# Patient Record
Sex: MALE | Race: WHITE | Employment: PART TIME | URBAN - METROPOLITAN AREA
[De-identification: names, ages, dates, MRNs, and addresses within clinical notes are randomized per-mention and may not be internally consistent; named-entity substitution may affect disease eponyms.]

---

## 2021-02-18 ENCOUNTER — TELEPHONE (OUTPATIENT)
Dept: FAMILY MEDICINE CLINIC | Age: 21
End: 2021-02-18

## 2021-02-22 ENCOUNTER — TELEPHONE (OUTPATIENT)
Dept: FAMILY MEDICINE CLINIC | Age: 21
End: 2021-02-22

## 2021-02-22 NOTE — TELEPHONE ENCOUNTER
I called and got pt scheduled for this appt .         Appointment Information   Name: Kenia Leach MRN: [id number]    Date: 2/26/2021 Status: [facility]    Time: 1:30 PM Length: 30 [id number]   Visit Type: VV SPECIAL USE CASE [[id number]] Copay: $0.00    Provider: Maged Dover DO Department: [facility]    Referral #:   Referral Status:      Referring Provider:   Patient Type:      Notes: DANNY [phone]  np est care     Disposition Notes:      Made On: 2/22/2021 12:36 PM By: Swapna Farrell

## 2021-02-22 NOTE — TELEPHONE ENCOUNTER
----- Message from Janine Verdugo sent at 2/10/2021 11:52 AM EST -----  Regarding: Dr. Mortimer Force: [phone]  Appointment not available    Caller's first and last name and relationship to patient (if not the patient): N/A      Best contact number:[phone]      Preferred date and time: As soon as possible, anytime      Scheduled appointment date and time: N/A      Reason for appointment: New patient appointment to est. PCP      Details to clarify the request: Doctors schedule is not coming up.         Janine Verdugo

## 2021-02-24 NOTE — TELEPHONE ENCOUNTER
Patient scheduled for:  Appointment Information   Name: Magalys Patrick MRN: [id number]    Date: 2/26/2021 Status: [facility]    Time: 1:30 PM Length: 30 [phone]   Visit Type: VV SPECIAL USE CASE [[id number]] Copay: $0.00    Provider: Osorio Madison DO Department: [facility]    Referral #:   Referral Status:      Referring Provider:   Patient Type:      Notes: DANNY [phone]      Close enc    Thanks  Dinesh Mcdowell S/PSR   [facility] [place] Referral Coordinator

## 2021-02-24 NOTE — TELEPHONE ENCOUNTER
----- Message from Madison Crawford sent at 2/18/2021 12:28 PM EST -----  Regarding: Dr. Clay Vega: [phone]  Appointment not available    Caller's first and last name and relationship to patient (if not the patient):N/A      Best contact number:[phone]      Preferred date and time: First available, anytime      Scheduled appointment date and time: N/A      Reason for appointment: New patient appointment. Details to clarify the request: Patient called last week on this but no one has called him back.         Madison Crawford

## 2021-03-02 ENCOUNTER — VIRTUAL VISIT (OUTPATIENT)
Dept: FAMILY MEDICINE CLINIC | Age: 21
End: 2021-03-02
Payer: COMMERCIAL

## 2021-03-02 DIAGNOSIS — F41.9 ANXIETY: ICD-10-CM

## 2021-03-02 DIAGNOSIS — Z13.6 ENCOUNTER FOR LIPID SCREENING FOR CARDIOVASCULAR DISEASE: ICD-10-CM

## 2021-03-02 DIAGNOSIS — Z00.00 ENCOUNTER FOR MEDICAL EXAMINATION TO ESTABLISH CARE: Primary | ICD-10-CM

## 2021-03-02 DIAGNOSIS — F12.90 MARIJUANA USE: ICD-10-CM

## 2021-03-02 DIAGNOSIS — Z13.220 ENCOUNTER FOR LIPID SCREENING FOR CARDIOVASCULAR DISEASE: ICD-10-CM

## 2021-03-02 DIAGNOSIS — Z83.3 FAMILY HISTORY OF DIABETES MELLITUS: ICD-10-CM

## 2021-03-02 DIAGNOSIS — F33.1 MODERATE EPISODE OF RECURRENT MAJOR DEPRESSIVE DISORDER (HCC): ICD-10-CM

## 2021-03-02 PROCEDURE — 99203 OFFICE O/P NEW LOW 30 MIN: CPT | Performed by: STUDENT IN AN ORGANIZED HEALTH CARE EDUCATION/TRAINING PROGRAM

## 2021-03-02 NOTE — PROGRESS NOTES
Marcial Huff  21 y.o. male  2000  [facility] 9293  [id number]   [address]:    Telemedicine Progress Note  Rosales Ashby DO       Encounter Date and Time: March 2, 2021 at 4:03 PM    Consent: Marcial Huff, who was seen by synchronous (real-time) audio-video technology, and/or his healthcare decision maker, is aware that this patient-initiated, Telehealth encounter on 3/2/2021 is a billable service, with coverage as determined by his insurance carrier. He is aware that he may receive a bill and has provided verbal consent to proceed: Yes. Chief Complaint   Patient presents with    Establish Care     History of Present Illness   Marcial Huff is a 21 y.o. male was evaluated by synchronous (real-time) audio-video technology from home, through a secure patient portal.    26yo male with PMHx of hemorrhoids, anxiety/depression here to establish care. [facility]  - Was previously having rectal pain and bleeding. No issues in the past 4 months  - Doesn't take anything for the hemorrhoids at this time  - Has seen GI in the past, had colonoscopy about 1.5 years ago, which was normal except for the hemorrhoids     Anxiety/depression  - Self medicates with marijuana, which he reports helps in  - Has been on wellbutrin, buspar, lexapro in the past, but not currently on any. Per patient, all meds made him feel tired/lethargic, sick to his stomach, and he had increased weight gain   - Has never seen therapist for mood issues  - Gets very irritable and angry easily  - Mom and MGM have hx of bipolar disorder  - No hx of manic episodes   - Denies SI, HI, AVH        PHQ-9 modified. How often have you been bothered by each of the following symptoms during the past 2 weeks? :  (none=0, several days a week=1, more than half of the days=2, nearly every day=3)   1. Feeling down or depressed, irritable or hopeless? 3  2. Little interest or pleasure in doing things? 1  3. Feeling tired or having little energy? 1   4. Trouble falling asleep, staying asleep, or sleeping too much? 3  5. Poor appetite, weight loss or overeating? 3   6. Feeling bad about yourself or feeling that your are a failure or that you have let yourself or your family down? 1  7. Trouble concentrating on things like school, work, reading or watching TV? 3  8. Moving or speaking so slowly that other people could have noticed? Or the opposite being fidgety or restless that you were moving around a lot more than usual? 3  9. Thoughts that you would be better off dead, or hurting yourself in some way? 0   Total: 18   (0-4 none, 5-9 mild, 10-14 moderate, 15-19 moderately severe, 20-27 severe)    Patient Screening for COVID-19:  1) Patient denies complaints of shortness of breath or difficulty breathing, sore throat,  chills, fatigue, muscle aches, loss of taste or smell, or GI symptoms(nausea, vomiting or diarrhea): Yes  2) Patient denies complaints of cough or fever over 100F: Yes  3) Patient denies leaving the country in the past 14 days or any other recent travel: Yes  4) Patient denies being exposed to anyone with COVID-19 and has not been around any one that has been sick with COVID-19 like symptoms as listed above: Yes  5) Patient informed to wear mask when arriving for appointment: Yes      Review of Systems   Review of Systems   Constitutional: Negative for chills and fever. HENT: Negative for sore throat. Respiratory: Negative for cough and shortness of breath. Cardiovascular: Negative for chest pain. Gastrointestinal: Negative for abdominal pain, blood in stool, constipation, diarrhea, nausea and vomiting. Genitourinary: Negative for dysuria and urgency. Neurological: Negative for dizziness and headaches. Psychiatric/Behavioral: Positive for depression and substance abuse. Negative for hallucinations and suicidal ideas.  The patient is nervous/anxious. Vitals/Objective:     General: alert, cooperative, no distress   Mental  status: mental status: alert, oriented to person, place, and time, normal mood, behavior, speech, dress, motor activity, and thought processes   Resp: resp: normal effort and no respiratory distress   Neuro: neuro: no gross deficits   Skin: skin: no discoloration or lesions of concern on visible areas   Due to this being a TeleHealth evaluation, many elements of the physical examination are unable to be assessed. Assessment and Plan:     1. Encounter for medical examination to establish care  - Patient to get records from previous PCP  - This provider to be PCP  - METABOLIC PANEL, BASIC; Future    2. Anxiety: PHQ 19 today, with no SI  - Discussed meds and/or therapy today. Patient would like to hold off on medications at this time, but open to readdressing in the future if needed. - Referral to cira to rule out bipolar due to significant fam hx.   - May consider referral to therapy at f/u if patient not able to get into psychiatry quickly   - REFERRAL TO PSYCHIATRY    3. Moderate episode of recurrent major depressive disorder ([facility] [address]  - Same as above  - REFERRAL TO PSYCHIATRY    4. Encounter for lipid screening for cardiovascular disease  - METABOLIC PANEL, BASIC; Future  - LIPID PANEL; Future    5. Family history of diabetes mellitus  - METABOLIC PANEL, BASIC; Future  - HEMOGLOBIN A1C WITH EAG; Future    6. Substance abuse: Marijuana and vaping  - Counseled on cessation        We discussed the expected course, resolution and complications of the diagnosis(es) in detail. Medication risks, benefits, costs, interactions, and alternatives were discussed as indicated. I advised him to contact the office if his condition worsens, changes or fails to improve as anticipated. He expressed understanding with the diagnosis(es) and plan.  Patient understands that this encounter was a temporary measure, and the importance of further follow up and examination was emphasized. Patient verbalized understanding. Patient informed to follow up: 6-8 weeks. Follow-up and Dispositions  ·   Return in about 6 weeks (around 4/13/2021) for depression/anxiety follow up . Routing History          Electronically Signed: Rachel Miranda DO    CPT Codes 91544-97220 for Established Patients may apply to this Telehealth Visit. POS code: 18. Modifier GT    Celeste Dee is a 21 y.o. male who was evaluated by an audio-video encounter for concerns as above. Patient identification was verified prior to start of the visit. A caregiver was present when appropriate. Due to this being a TeleHealth encounter (During [facility]- public health emergency), evaluation of the following organ systems was limited: Vitals/Constitutional/EENT/Resp/CV/GI//MS/Neuro/Skin/Heme-Lymph-Imm. Pursuant to the emergency declaration under the [facility], [facility] waiver authority and the [employer] and [employer] Act, this Virtual Visit was conducted, with patient's (and/or legal guardian's) consent, to reduce the patient's risk of exposure to COVID-19 and provide necessary medical care. Services were provided through a synchronous discussion virtually to substitute for in-person clinic visit. I was in the office. The patient was at home. History   Patients past medical, surgical and family histories were reviewed and updated. Past Medical History:   Diagnosis Date    Anxiety     Depression     Hemorrhoids      History reviewed. No pertinent surgical history.   Family History   Problem Relation Age of Onset    Bipolar Disorder Mother     Type I Diabetes Mother     Diabetes Father         type II    Type I Diabetes Sister     Bipolar Disorder Maternal Grandmother      Social History     Tobacco Use    Smoking status: Former Smoker     Packs/day: 0.25     Years: 2.00     Pack years: 0.50     Types: Cigarettes     Start date: 3/9/2016     Quit date: 3/7/2018     Years since quittin.9   Substance Use Topics    Alcohol use: Yes     Frequency: Monthly or less    Drug use: Yes     Types: Marijuana     Patient Active Problem List   Diagnosis Code    Anxiety F41.9    Depression F32.9    Hemorrhoids K64.9          Current Medications/Allergies   Medications and Allergies reviewed:      Not on File

## 2021-03-02 NOTE — Clinical Note
Please call patient to schedule  1. Lab visit as soon as availabe. Covid screen neg  2.  Depression/anxiety follow up in 6-8 weeks with me, may be VV

## 2021-03-02 NOTE — PROGRESS NOTES
[facility] Residency Attending Addendum:  Dr. Yunier Phillips DO,  the patient and I were not physically present during this encounter. The resident and I are concurrently monitoring the patient care through appropriate telecommunication technology. I discussed the findings, assessment and plan with the resident and agree with the resident's findings and plan as documented in the resident's note.         Patient-Reported Vitals 3/2/2021   Patient-Reported Weight 216   Patient-Reported Height 61   Patient-Reported Temperature 98.8        Jeannette Gutierrez MD

## 2021-03-11 ENCOUNTER — LAB ONLY (OUTPATIENT)
Dept: FAMILY MEDICINE CLINIC | Age: 21
End: 2021-03-11

## 2021-03-11 DIAGNOSIS — Z13.220 ENCOUNTER FOR LIPID SCREENING FOR CARDIOVASCULAR DISEASE: ICD-10-CM

## 2021-03-11 DIAGNOSIS — Z83.3 FAMILY HISTORY OF DIABETES MELLITUS: ICD-10-CM

## 2021-03-11 DIAGNOSIS — Z00.00 ENCOUNTER FOR MEDICAL EXAMINATION TO ESTABLISH CARE: ICD-10-CM

## 2021-03-11 DIAGNOSIS — Z13.6 ENCOUNTER FOR LIPID SCREENING FOR CARDIOVASCULAR DISEASE: ICD-10-CM

## 2021-03-11 LAB
ANION GAP SERPL CALC-SCNC: 7 MMOL/L (ref 5–15)
BUN SERPL-MCNC: 13 MG/DL (ref 6–20)
BUN/CREAT SERPL: 15 (ref 12–20)
CALCIUM SERPL-MCNC: 9.6 MG/DL (ref 8.5–10.1)
CHLORIDE SERPL-SCNC: 105 MMOL/L (ref 97–108)
CHOLEST SERPL-MCNC: 123 MG/DL
CO2 SERPL-SCNC: 25 MMOL/L (ref 21–32)
CREAT SERPL-MCNC: 0.89 MG/DL (ref 0.7–1.3)
EST. AVERAGE GLUCOSE BLD GHB EST-MCNC: 97 MG/DL
GLUCOSE SERPL-MCNC: 91 MG/DL (ref 65–100)
HBA1C MFR BLD: 5 % (ref 4–5.6)
HDLC SERPL-MCNC: 41 MG/DL
HDLC SERPL: 3 {RATIO} (ref 0–5)
LDLC SERPL CALC-MCNC: 66 MG/DL (ref 0–100)
LIPID PROFILE,FLP: NORMAL
POTASSIUM SERPL-SCNC: 4.2 MMOL/L (ref 3.5–5.1)
SODIUM SERPL-SCNC: 137 MMOL/L (ref 136–145)
TRIGL SERPL-MCNC: 80 MG/DL (ref ?–150)
VLDLC SERPL CALC-MCNC: 16 MG/DL

## 2021-04-08 PROBLEM — F12.90 MARIJUANA USE: Status: ACTIVE | Noted: 2021-04-08

## 2021-05-07 ENCOUNTER — VIRTUAL VISIT (OUTPATIENT)
Dept: BEHAVIORAL/MENTAL HEALTH CLINIC | Age: 21
End: 2021-05-07
Payer: COMMERCIAL

## 2021-05-07 DIAGNOSIS — F41.1 GENERALIZED ANXIETY DISORDER: Primary | ICD-10-CM

## 2021-05-07 DIAGNOSIS — F31.9 BIPOLAR DEPRESSION (HCC): ICD-10-CM

## 2021-05-07 PROCEDURE — 90792 PSYCH DIAG EVAL W/MED SRVCS: CPT | Performed by: NURSE PRACTITIONER

## 2021-05-07 RX ORDER — HYDROXYZINE HYDROCHLORIDE 10 MG/1
10 TABLET, FILM COATED ORAL
Qty: 60 TAB | Refills: 0 | Status: SHIPPED | OUTPATIENT
Start: 2021-05-07 | End: 2021-06-04 | Stop reason: SDUPTHER

## 2021-05-07 RX ORDER — QUETIAPINE FUMARATE 25 MG/1
25 TABLET, FILM COATED ORAL
Qty: 30 TAB | Refills: 1 | Status: SHIPPED | OUTPATIENT
Start: 2021-05-07 | End: 2021-06-04 | Stop reason: SDUPTHER
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
## 2021-05-10 ENCOUNTER — TELEPHONE (OUTPATIENT)
Dept: BEHAVIORAL/MENTAL HEALTH CLINIC | Age: 21
End: 2021-05-10

## 2021-05-11 ENCOUNTER — TELEPHONE (OUTPATIENT)
Dept: BEHAVIORAL/MENTAL HEALTH CLINIC | Age: 21
End: 2021-05-11

## 2021-05-12 RX ORDER — ALBUTEROL SULFATE 90 UG/1
AEROSOL, METERED RESPIRATORY (INHALATION)
COMMUNITY
Start: 2020-08-01 | End: 2021-08-01

## 2021-07-22 ENCOUNTER — TELEPHONE (OUTPATIENT)
Dept: FAMILY MEDICINE CLINIC | Age: 21
End: 2021-07-22

## 2021-07-22 DIAGNOSIS — F41.1 GENERALIZED ANXIETY DISORDER: ICD-10-CM

## 2021-07-22 DIAGNOSIS — F31.9 BIPOLAR DEPRESSION (HCC): Primary | ICD-10-CM

## 2021-07-22 RX ORDER — QUETIAPINE FUMARATE 25 MG/1
25 TABLET, FILM COATED ORAL
Qty: 90 TABLET | Refills: 0 | Status: SHIPPED | OUTPATIENT
Start: 2021-07-22 | End: 2021-10-20

## 2021-07-22 RX ORDER — HYDROXYZINE HYDROCHLORIDE 10 MG/1
10 TABLET, FILM COATED ORAL
Qty: 180 TABLET | Refills: 0 | Status: SHIPPED | OUTPATIENT
Start: 2021-07-22 | End: 2021-10-20

## 2021-07-22 NOTE — TELEPHONE ENCOUNTER
----- Message from Mc Bertrand sent at 7/22/2021  8:05 AM EDT -----  Regarding: Dr. Mcknight Paci: [phone]  General Message/Vendor Calls    Caller's first and last name: N/A      Reason for call: COVID Testing. Callback required yes/no and why:Yes/Confirm      Best contact number(s):[phone]      Details to clarify the request: Patient would like to know if the practice does CVID testing.         Mc Bertrand

## 2022-03-20 PROBLEM — F12.90 MARIJUANA USE: Status: ACTIVE | Noted: 2021-04-08

## 2023-05-11 ENCOUNTER — HOSPITAL ENCOUNTER (EMERGENCY)
Facility: HOSPITAL | Age: 23
Discharge: HOME | End: 2023-05-11
Attending: EMERGENCY MEDICINE
Payer: COMMERCIAL

## 2023-05-11 VITALS
TEMPERATURE: 98 F | BODY MASS INDEX: 28.88 KG/M2 | RESPIRATION RATE: 18 BRPM | HEIGHT: 74 IN | OXYGEN SATURATION: 95 % | WEIGHT: 225 LBS | HEART RATE: 90 BPM | DIASTOLIC BLOOD PRESSURE: 96 MMHG | SYSTOLIC BLOOD PRESSURE: 123 MMHG

## 2023-05-11 DIAGNOSIS — R00.2 PALPITATIONS: Primary | ICD-10-CM

## 2023-05-11 LAB
ALBUMIN SERPL-MCNC: 4.5 G/DL (ref 3.5–5)
ALBUMIN/GLOB SERPL: 1.6 (ref 1.1–2.2)
ALP SERPL-CCNC: 88 U/L (ref 45–117)
ALT SERPL-CCNC: 38 U/L (ref 12–78)
ANION GAP SERPL CALC-SCNC: 14 MMOL/L (ref 5–15)
AST SERPL W P-5'-P-CCNC: 25 U/L (ref 15–37)
BASOPHILS # BLD: 0.1 K/UL (ref 0–0.1)
BASOPHILS NFR BLD: 1 % (ref 0–1)
BILIRUB DIRECT SERPL-MCNC: 0.2 MG/DL (ref 0–0.2)
BILIRUB SERPL-MCNC: 1.1 MG/DL (ref 0.2–1)
BUN SERPL-MCNC: 10 MG/DL (ref 6–20)
BUN/CREAT SERPL: 12 (ref 12–20)
CA-I BLD-MCNC: 9.4 MG/DL (ref 8.5–10.1)
CHLORIDE SERPL-SCNC: 98 MMOL/L (ref 97–108)
CO2 SERPL-SCNC: 23 MMOL/L (ref 21–32)
CREAT SERPL-MCNC: 0.82 MG/DL (ref 0.7–1.3)
DIFFERENTIAL METHOD BLD: ABNORMAL
EOSINOPHIL # BLD: 0.1 K/UL (ref 0–0.4)
EOSINOPHIL NFR BLD: 1 % (ref 0–7)
ERYTHROCYTE [DISTWIDTH] IN BLOOD BY AUTOMATED COUNT: 12 % (ref 11.5–14.5)
GLOBULIN SER CALC-MCNC: 2.9 G/DL (ref 2–4)
GLUCOSE SERPL-MCNC: 83 MG/DL (ref 65–100)
HCT VFR BLD AUTO: 43.6 % (ref 36.6–50.3)
HGB BLD-MCNC: 15.5 G/DL (ref 12.1–17)
IMM GRANULOCYTES # BLD AUTO: 0 K/UL (ref 0–0.04)
IMM GRANULOCYTES NFR BLD AUTO: 0 % (ref 0–0.5)
LYMPHOCYTES # BLD: 1.6 K/UL (ref 0.8–3.5)
LYMPHOCYTES NFR BLD: 14 % (ref 12–49)
MCH RBC QN AUTO: 31.4 PG (ref 26–34)
MCHC RBC AUTO-ENTMCNC: 35.6 G/DL (ref 30–36.5)
MCV RBC AUTO: 88.3 FL (ref 80–99)
MONOCYTES # BLD: 0.8 K/UL (ref 0–1)
MONOCYTES NFR BLD: 7 % (ref 5–13)
NEUTS SEG # BLD: 9.2 K/UL (ref 1.8–8)
NEUTS SEG NFR BLD: 77 % (ref 32–75)
PLATELET # BLD AUTO: 363 K/UL (ref 150–400)
PMV BLD AUTO: 11.2 FL (ref 8.9–12.9)
POTASSIUM SERPL-SCNC: 3.7 MMOL/L (ref 3.5–5.1)
PROT SERPL-MCNC: 7.4 G/DL (ref 6.4–8.2)
RBC # BLD AUTO: 4.94 M/UL (ref 4.1–5.7)
SODIUM SERPL-SCNC: 135 MMOL/L (ref 136–145)
TROPONIN I SERPL HS-MCNC: 5 NG/L (ref 0–76)
WBC # BLD AUTO: 11.8 K/UL (ref 4.1–11.1)

## 2023-05-11 PROCEDURE — 80076 HEPATIC FUNCTION PANEL: CPT

## 2023-05-11 PROCEDURE — 84484 ASSAY OF TROPONIN QUANT: CPT

## 2023-05-11 PROCEDURE — 93005 ELECTROCARDIOGRAM TRACING: CPT | Performed by: EMERGENCY MEDICINE

## 2023-05-11 PROCEDURE — 2580000003 HC RX 258: Performed by: EMERGENCY MEDICINE

## 2023-05-11 PROCEDURE — 36415 COLL VENOUS BLD VENIPUNCTURE: CPT

## 2023-05-11 PROCEDURE — 80048 BASIC METABOLIC PNL TOTAL CA: CPT

## 2023-05-11 PROCEDURE — 96360 HYDRATION IV INFUSION INIT: CPT

## 2023-05-11 PROCEDURE — 99284 EMERGENCY DEPT VISIT MOD MDM: CPT

## 2023-05-11 PROCEDURE — 85025 COMPLETE CBC W/AUTO DIFF WBC: CPT

## 2023-05-11 RX ORDER — HYDROXYZINE HYDROCHLORIDE 25 MG/1
25 TABLET, FILM COATED ORAL 3 TIMES DAILY PRN
COMMUNITY

## 2023-05-11 RX ORDER — 0.9 % SODIUM CHLORIDE 0.9 %
1000 INTRAVENOUS SOLUTION INTRAVENOUS
Status: COMPLETED | OUTPATIENT
Start: 2023-05-11 | End: 2023-05-11

## 2023-05-11 RX ORDER — 0.9 % SODIUM CHLORIDE 0.9 %
1000 INTRAVENOUS SOLUTION INTRAVENOUS
Status: DISCONTINUED | OUTPATIENT
Start: 2023-05-11 | End: 2023-05-11

## 2023-05-11 RX ORDER — ESCITALOPRAM OXALATE 10 MG/1
TABLET ORAL DAILY
COMMUNITY
Start: 2019-07-12

## 2023-05-11 RX ADMIN — SODIUM CHLORIDE 1000 ML: 9 INJECTION, SOLUTION INTRAVENOUS at 03:04

## 2023-05-11 ASSESSMENT — PAIN SCALES - GENERAL: PAINLEVEL_OUTOF10: 0

## 2023-05-11 ASSESSMENT — PAIN - FUNCTIONAL ASSESSMENT
PAIN_FUNCTIONAL_ASSESSMENT: 0-10
PAIN_FUNCTIONAL_ASSESSMENT: NONE - DENIES PAIN
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
## 2023-05-11 NOTE — ED PROVIDER NOTES
Value Ref Range    WBC 11.8 (H) 4.1 - 11.1 K/uL    RBC 4.94 4.10 - 5.70 M/uL    Hemoglobin 15.5 12.1 - 17.0 g/dL    Hematocrit 43.6 36.6 - 50.3 %    MCV 88.3 80.0 - 99.0 FL    MCH 31.4 26.0 - 34.0 PG    MCHC 35.6 30.0 - 36.5 g/dL    RDW 12.0 11.5 - 14.5 %    Platelets 679 274 - 398 K/uL    MPV 11.2 8.9 - 12.9 FL    Seg Neutrophils 77 (H) 32 - 75 %    Lymphocytes 14 12 - 49 %    Monocytes 7 5 - 13 %    Eosinophils % 1 0 - 7 %    Basophils 1 0 - 1 %    Immature Granulocytes 0 0.0 - 0.5 %    Segs Absolute 9.2 (H) 1.8 - 8.0 K/UL    Absolute Lymph # 1.6 0.8 - 3.5 K/UL    Absolute Mono # 0.8 0.0 - 1.0 K/UL    Absolute Eos # 0.1 0.0 - 0.4 K/UL    Basophils Absolute 0.1 0.0 - 0.1 K/UL    Absolute Immature Granulocyte 0.0 0.00 - 0.04 K/UL    Differential Type AUTOMATED         Radiologic Studies -   No orders to display     [unfilled]  [unfilled]  EKG interpreted by me normal sinus rhythm at 100 qt normal axis no STEMI    Medical Decision Making   I am the first provider for this patient. I reviewed the vital signs, available nursing notes, past medical history, past surgical history, family history and social history. Patient present was intermittent episode of palpitation. Patient relates to use of cocaine last night. Clinically patient is alert and in no apparent distress. Nontoxic-appearing. Well-hydrated. No respiratory distress. EKG was normal at the time of the presentation. Cardiac monitor continues showed normal sinus rhythm throughout the stay emergency room. Labs were unremarkable including normal electrolyte and negative troponin. Patient was discharged in stable condition. Vital Signs-Reviewed the patient's vital signs. [unfilled]    Records Reviewed:  Available old ER/admission charts reviewed    ED Course:   Initial assessment performed. The patients presenting problems have been discussed, and they are in agreement with the care plan formulated and outlined with them.   I have encouraged them to

## 2023-05-12 LAB
EKG ATRIAL RATE: 100 BPM
EKG DIAGNOSIS: NORMAL
EKG P AXIS: 49 DEGREES
EKG P-R INTERVAL: 152 MS
EKG Q-T INTERVAL: 342 MS
EKG QRS DURATION: 86 MS
EKG QTC CALCULATION (BAZETT): 441 MS
EKG R AXIS: 45 DEGREES
EKG T AXIS: 17 DEGREES
EKG VENTRICULAR RATE: 100 BPM